# Patient Record
Sex: FEMALE | Race: ASIAN | NOT HISPANIC OR LATINO
[De-identification: names, ages, dates, MRNs, and addresses within clinical notes are randomized per-mention and may not be internally consistent; named-entity substitution may affect disease eponyms.]

---

## 2017-06-05 ENCOUNTER — RESULT REVIEW (OUTPATIENT)
Age: 37
End: 2017-06-05

## 2017-07-25 ENCOUNTER — APPOINTMENT (OUTPATIENT)
Dept: ORTHOPEDIC SURGERY | Facility: CLINIC | Age: 37
End: 2017-07-25

## 2017-07-25 VITALS
WEIGHT: 118 LBS | SYSTOLIC BLOOD PRESSURE: 120 MMHG | HEIGHT: 59 IN | DIASTOLIC BLOOD PRESSURE: 87 MMHG | BODY MASS INDEX: 23.79 KG/M2 | HEART RATE: 76 BPM

## 2017-07-25 DIAGNOSIS — Z78.9 OTHER SPECIFIED HEALTH STATUS: ICD-10-CM

## 2017-07-25 DIAGNOSIS — M53.3 SACROCOCCYGEAL DISORDERS, NOT ELSEWHERE CLASSIFIED: ICD-10-CM

## 2019-01-14 ENCOUNTER — RESULT REVIEW (OUTPATIENT)
Age: 39
End: 2019-01-14

## 2019-02-06 ENCOUNTER — RESULT REVIEW (OUTPATIENT)
Age: 39
End: 2019-02-06

## 2019-03-18 ENCOUNTER — OUTPATIENT (OUTPATIENT)
Dept: OUTPATIENT SERVICES | Facility: HOSPITAL | Age: 39
LOS: 1 days | End: 2019-03-18

## 2019-03-18 VITALS
WEIGHT: 119.93 LBS | RESPIRATION RATE: 16 BRPM | HEIGHT: 59 IN | SYSTOLIC BLOOD PRESSURE: 120 MMHG | TEMPERATURE: 98 F | DIASTOLIC BLOOD PRESSURE: 80 MMHG | HEART RATE: 60 BPM

## 2019-03-18 DIAGNOSIS — N84.0 POLYP OF CORPUS UTERI: ICD-10-CM

## 2019-03-18 DIAGNOSIS — N84.0 POLYP OF CORPUS UTERI: Chronic | ICD-10-CM

## 2019-03-18 LAB
BLD GP AB SCN SERPL QL: NEGATIVE — SIGNIFICANT CHANGE UP
HCT VFR BLD CALC: 32.9 % — LOW (ref 34.5–45)
HGB BLD-MCNC: 10.1 G/DL — LOW (ref 11.5–15.5)
MCHC RBC-ENTMCNC: 25.1 PG — LOW (ref 27–34)
MCHC RBC-ENTMCNC: 30.7 % — LOW (ref 32–36)
MCV RBC AUTO: 81.8 FL — SIGNIFICANT CHANGE UP (ref 80–100)
NRBC # FLD: 0.02 K/UL — LOW (ref 25–125)
PLATELET # BLD AUTO: 457 K/UL — HIGH (ref 150–400)
PMV BLD: 10.7 FL — SIGNIFICANT CHANGE UP (ref 7–13)
RBC # BLD: 4.02 M/UL — SIGNIFICANT CHANGE UP (ref 3.8–5.2)
RBC # FLD: 15.1 % — HIGH (ref 10.3–14.5)
RH IG SCN BLD-IMP: POSITIVE — SIGNIFICANT CHANGE UP
WBC # BLD: 5.89 K/UL — SIGNIFICANT CHANGE UP (ref 3.8–10.5)
WBC # FLD AUTO: 5.89 K/UL — SIGNIFICANT CHANGE UP (ref 3.8–10.5)

## 2019-03-18 RX ORDER — SODIUM CHLORIDE 9 MG/ML
1000 INJECTION, SOLUTION INTRAVENOUS
Qty: 0 | Refills: 0 | Status: DISCONTINUED | OUTPATIENT
Start: 2019-04-01 | End: 2019-04-02

## 2019-03-18 NOTE — H&P PST ADULT - NSICDXPASTSURGICALHX_GEN_ALL_CORE_FT
PAST SURGICAL HISTORY:  C Section x 2 2004 and 2008     h/o Tonsillectomy 1987 PAST SURGICAL HISTORY:  C Section x 2 2004 and 2008     h/o Tonsillectomy 1987     Uterine polyp 2009

## 2019-03-18 NOTE — H&P PST ADULT - HISTORY OF PRESENT ILLNESS
39 y/o female with hx of irregular heavy menstruation x6 months, and anemia.  Dx with uterine polyp, scheduled for Dilation and Curettage Hysteroscopy with symphion HTA, insertion of Mirena IUD with sono guidance. 37 y/o female with hx of irregular heavy menstruation x6 months, and anemia.  Dx with uterine polyp, scheduled for Dilation and Curettage Hysteroscopy with symphion HTA, insertion of Mirena IUD with sono guidance 4/1/19.

## 2019-03-18 NOTE — H&P PST ADULT - NSANTHOSAYNRD_GEN_A_CORE
No. KRYSTA screening performed.  STOP BANG Legend: 0-2 = LOW Risk; 3-4 = INTERMEDIATE Risk; 5-8 = HIGH Risk

## 2019-03-18 NOTE — H&P PST ADULT - GENITOURINARY COMMENTS
hx of irregular heavy menstruation x6 months, and anemia.  Dx with uterine polyp, scheduled for Dilation and Curettage Hysteroscopy with symphion HTA, insertion of Mirena IUD with sono guidance.

## 2019-03-18 NOTE — H&P PST ADULT - ASSESSMENT
39 y/o female with hx of irregular heavy menstruation x6 months, and anemia.  Dx with uterine polyp, scheduled for Dilation and Curettage Hysteroscopy with symphion HTA, insertion of Mirena IUD with sono guidance 4/1/19.

## 2019-03-18 NOTE — H&P PST ADULT - NSICDXPROBLEM_GEN_ALL_CORE_FT
PROBLEM DIAGNOSES  Problem: Uterine polyp  Assessment and Plan:  Dilation and Curettage Hysteroscopy with symphion HTA, insertion of Mirena IUD with sono guidance 4/1/19.  CBC T&S  Pre-op instructions given and explained

## 2019-03-31 ENCOUNTER — TRANSCRIPTION ENCOUNTER (OUTPATIENT)
Age: 39
End: 2019-03-31

## 2019-04-01 ENCOUNTER — OUTPATIENT (OUTPATIENT)
Dept: OUTPATIENT SERVICES | Facility: HOSPITAL | Age: 39
LOS: 1 days | Discharge: HOME | End: 2019-04-01
Payer: COMMERCIAL

## 2019-04-01 ENCOUNTER — RESULT REVIEW (OUTPATIENT)
Age: 39
End: 2019-04-01

## 2019-04-01 VITALS
OXYGEN SATURATION: 100 % | TEMPERATURE: 98 F | HEIGHT: 59 IN | SYSTOLIC BLOOD PRESSURE: 110 MMHG | WEIGHT: 119.93 LBS | RESPIRATION RATE: 15 BRPM | HEART RATE: 55 BPM | DIASTOLIC BLOOD PRESSURE: 66 MMHG

## 2019-04-01 VITALS
DIASTOLIC BLOOD PRESSURE: 68 MMHG | OXYGEN SATURATION: 100 % | RESPIRATION RATE: 15 BRPM | SYSTOLIC BLOOD PRESSURE: 118 MMHG | HEART RATE: 76 BPM | TEMPERATURE: 98 F

## 2019-04-01 DIAGNOSIS — N84.0 POLYP OF CORPUS UTERI: Chronic | ICD-10-CM

## 2019-04-01 DIAGNOSIS — N84.0 POLYP OF CORPUS UTERI: ICD-10-CM

## 2019-04-01 LAB — HCG UR QL: NEGATIVE — SIGNIFICANT CHANGE UP

## 2019-04-01 PROCEDURE — 88305 TISSUE EXAM BY PATHOLOGIST: CPT | Mod: 26

## 2019-04-01 RX ORDER — ACETAMINOPHEN 500 MG
2 TABLET ORAL
Qty: 0 | Refills: 0 | COMMUNITY
Start: 2019-04-01

## 2019-04-01 RX ORDER — ACETAMINOPHEN 500 MG
650 TABLET ORAL EVERY 6 HOURS
Qty: 0 | Refills: 0 | Status: DISCONTINUED | OUTPATIENT
Start: 2019-04-01 | End: 2019-04-02

## 2019-04-01 RX ORDER — IBUPROFEN 200 MG
1 TABLET ORAL
Qty: 0 | Refills: 0 | COMMUNITY
Start: 2019-04-01

## 2019-04-01 RX ORDER — ERGOCALCIFEROL 1.25 MG/1
1 CAPSULE ORAL
Qty: 0 | Refills: 0 | COMMUNITY

## 2019-04-01 RX ORDER — IBUPROFEN 200 MG
600 TABLET ORAL EVERY 6 HOURS
Qty: 0 | Refills: 0 | Status: DISCONTINUED | OUTPATIENT
Start: 2019-04-01 | End: 2019-04-02

## 2019-04-01 NOTE — BRIEF OPERATIVE NOTE - NSICDXBRIEFPROCEDURE_GEN_ALL_CORE_FT
PROCEDURES:  Insertion of Mirena IUD 01-Apr-2019 09:01:12  Anuel Foreman  Hysteroscopy, with D&C of uterus and endometrium hydrothermal ablation 01-Apr-2019 09:01:02  Anuel Foreman

## 2019-04-01 NOTE — ASU DISCHARGE PLAN (ADULT/PEDIATRIC) - CARE PROVIDER_API CALL
Gautam Murphy)  Obstetrics and Gynecology  [address] [place] [address]  Phone: [phone]  Fax: [phone]  Follow Up Time:

## 2019-04-01 NOTE — ASU DISCHARGE PLAN (ADULT/PEDIATRIC) - CALL YOUR DOCTOR IF YOU HAVE ANY OF THE FOLLOWING:
Nausea and vomiting that does not stop/Increased irritability or sluggishness/Inability to tolerate liquids or foods/Numbness, tingling, color or temperature change to extremity/Fever greater than (need to indicate Fahrenheit or Celsius)/Wound/Surgical Site with redness, or foul smelling discharge or pus/Unable to urinate/Excessive diarrhea/Bleeding that does not stop/Swelling that gets worse/Pain not relieved by Medications

## 2019-04-01 NOTE — ASU DISCHARGE PLAN (ADULT/PEDIATRIC) - ASU DC SPECIAL INSTRUCTIONSFT
Please take up to 975mg Tylenol every 6 hrs and/or 600mg ibuprofen every 6hours. Follow up with Dr. Murphy at the office.

## 2019-04-01 NOTE — BRIEF OPERATIVE NOTE - OPERATION/FINDINGS
Long closed cervix, antiverted uterus, grossly normal cavity and ostia. no polyps noted. Mirena IUD placed under ultrasound guidance (Lot: [id number]

## 2019-04-04 LAB — SURGICAL PATHOLOGY STUDY: SIGNIFICANT CHANGE UP
